# Patient Record
Sex: MALE | Race: WHITE | NOT HISPANIC OR LATINO
[De-identification: names, ages, dates, MRNs, and addresses within clinical notes are randomized per-mention and may not be internally consistent; named-entity substitution may affect disease eponyms.]

---

## 2024-02-29 PROBLEM — Z00.00 ENCOUNTER FOR PREVENTIVE HEALTH EXAMINATION: Status: ACTIVE | Noted: 2024-02-29

## 2024-03-05 ENCOUNTER — APPOINTMENT (OUTPATIENT)
Dept: CARDIOLOGY | Facility: CLINIC | Age: 59
End: 2024-03-05
Payer: COMMERCIAL

## 2024-03-05 ENCOUNTER — NON-APPOINTMENT (OUTPATIENT)
Age: 59
End: 2024-03-05

## 2024-03-05 VITALS
DIASTOLIC BLOOD PRESSURE: 87 MMHG | HEART RATE: 77 BPM | OXYGEN SATURATION: 95 % | WEIGHT: 252 LBS | HEIGHT: 68 IN | BODY MASS INDEX: 38.19 KG/M2 | SYSTOLIC BLOOD PRESSURE: 146 MMHG

## 2024-03-05 DIAGNOSIS — R06.02 SHORTNESS OF BREATH: ICD-10-CM

## 2024-03-05 DIAGNOSIS — K75.81 NONALCOHOLIC STEATOHEPATITIS (NASH): ICD-10-CM

## 2024-03-05 DIAGNOSIS — J45.20 MILD INTERMITTENT ASTHMA, UNCOMPLICATED: ICD-10-CM

## 2024-03-05 DIAGNOSIS — Z78.9 OTHER SPECIFIED HEALTH STATUS: ICD-10-CM

## 2024-03-05 DIAGNOSIS — I49.3 VENTRICULAR PREMATURE DEPOLARIZATION: ICD-10-CM

## 2024-03-05 DIAGNOSIS — I05.9 RHEUMATIC MITRAL VALVE DISEASE, UNSPECIFIED: ICD-10-CM

## 2024-03-05 PROCEDURE — 99204 OFFICE O/P NEW MOD 45 MIN: CPT

## 2024-03-05 PROCEDURE — G2211 COMPLEX E/M VISIT ADD ON: CPT

## 2024-03-05 PROCEDURE — 93000 ELECTROCARDIOGRAM COMPLETE: CPT

## 2024-03-05 RX ORDER — LISINOPRIL 5 MG/1
5 TABLET ORAL
Refills: 0 | Status: ACTIVE | COMMUNITY

## 2024-03-05 RX ORDER — BUDESONIDE AND FORMOTEROL FUMARATE DIHYDRATE 160; 4.5 UG/1; UG/1
AEROSOL RESPIRATORY (INHALATION)
Refills: 0 | Status: ACTIVE | COMMUNITY

## 2024-03-05 NOTE — HISTORY OF PRESENT ILLNESS
[FreeTextEntry1] : Ck presented to the office today for a cardiovascular evaluation.  He presents for the further evaluation of his risk factors.  He is now 58 years old, with a history of hypertension.  He has a history of hyperlipidemia not treated until recently.  He does not have a history of diabetes.  He is not known to have any underlying structural heart disease.  He was more recently discovered to have a high calcium score, placing him into the 96 percentile for age and ethnicity.  He has a history of nonalcoholic steatohepatitis, and he has been on Ozempic for about the last year.  LFTs, which have been elevated, have more recently been normal.  He has a history of PVCs in the past.  At baseline, he tries to stay physically active.  He does not exercise, but he does remodeling, and his own yard work.  With regular physical activities, he feels well, without reproducible chest discomfort or shortness of breath suggestive of angina.  He may experience a degree of dyspnea with activity, not definitively out of proportion to his degree of conditioning.  He denies orthopnea, PND and lower extremity edema.  He denies palpitations, dizziness and syncope.  He has been in his usual state of health generally speaking.  At the behest of his wife, he had a calcium score.  This placed him into the 96th percentile.  He was started on a small dose of atorvastatin.  He was concerned about the implications for his overall cardiovascular health, and therefore comes to the office today for evaluation.  Family history includes a history of colon cancer.  His father had a myocardial infarction at the age of 58, and has subsequent double vessel CABG.

## 2024-03-05 NOTE — PHYSICAL EXAM
[Well Developed] : well developed [No Acute Distress] : no acute distress [Well Nourished] : well nourished [Normal Conjunctiva] : normal conjunctiva [Normal Venous Pressure] : normal venous pressure [No Carotid Bruit] : no carotid bruit [Normal S1, S2] : normal S1, S2 [No Murmur] : no murmur [No Rub] : no rub [Clear Lung Fields] : clear lung fields [No Gallop] : no gallop [Good Air Entry] : good air entry [No Respiratory Distress] : no respiratory distress  [Soft] : abdomen soft [Non Tender] : non-tender [No Masses/organomegaly] : no masses/organomegaly [Normal Bowel Sounds] : normal bowel sounds [Normal Gait] : normal gait [No Edema] : no edema [No Cyanosis] : no cyanosis [No Clubbing] : no clubbing [No Varicosities] : no varicosities [Moves all extremities] : moves all extremities [No Skin Lesions] : no skin lesions [No Rash] : no rash [No Focal Deficits] : no focal deficits [Alert and Oriented] : alert and oriented [Normal Speech] : normal speech [Normal memory] : normal memory

## 2024-03-05 NOTE — DISCUSSION/SUMMARY
[FreeTextEntry1] : Ck has a history of mild hypertension, hypercholesterolemia, nonalcoholic steatohepatitis.  He has a history of PVCs in the past.  He has been discovered to have a high calcium score, placing him into the 96 percentile.  We had an extensive conversation about his overall cardiovascular risk.  He is overall quite concerned, and wishes to make modifications where appropriate.  It is not clear whether his dyspnea reflects deconditioning, or is a reflection of underlying structural heart disease.  I have suggested an echocardiogram as well as nuclear stress testing.  I have requested a follow-up in 6 weeks.  He will plan to have blood work done at that time so that we can hopefully ramp up his dose of atorvastatin.  We will certainly need to keep a close eye on his LFTs.  I would consider the possibility of an extended Holter at the time of future visits, pending his clinical course. [EKG obtained to assist in diagnosis and management of assessed problem(s)] : EKG obtained to assist in diagnosis and management of assessed problem(s)

## 2024-03-07 ENCOUNTER — APPOINTMENT (OUTPATIENT)
Dept: CARDIOLOGY | Facility: CLINIC | Age: 59
End: 2024-03-07
Payer: COMMERCIAL

## 2024-03-07 ENCOUNTER — TRANSCRIPTION ENCOUNTER (OUTPATIENT)
Age: 59
End: 2024-03-07

## 2024-03-07 PROCEDURE — 93306 TTE W/DOPPLER COMPLETE: CPT

## 2024-03-07 RX ADMIN — PERFLUTREN MG/ML: 6.52 INJECTION, SUSPENSION INTRAVENOUS at 00:00

## 2024-03-08 ENCOUNTER — NON-APPOINTMENT (OUTPATIENT)
Age: 59
End: 2024-03-08

## 2024-03-08 RX ORDER — PERFLUTREN 6.52 MG/ML
6.52 INJECTION, SUSPENSION INTRAVENOUS
Qty: 3 | Refills: 0 | Status: COMPLETED | OUTPATIENT
Start: 2024-03-07

## 2024-03-11 ENCOUNTER — APPOINTMENT (OUTPATIENT)
Dept: CARDIOLOGY | Facility: CLINIC | Age: 59
End: 2024-03-11
Payer: COMMERCIAL

## 2024-03-11 PROCEDURE — 78452 HT MUSCLE IMAGE SPECT MULT: CPT

## 2024-03-11 PROCEDURE — A9500: CPT

## 2024-03-11 PROCEDURE — 93015 CV STRESS TEST SUPVJ I&R: CPT

## 2024-04-18 ENCOUNTER — APPOINTMENT (OUTPATIENT)
Dept: CARDIOLOGY | Facility: CLINIC | Age: 59
End: 2024-04-18
Payer: COMMERCIAL

## 2024-04-18 ENCOUNTER — NON-APPOINTMENT (OUTPATIENT)
Age: 59
End: 2024-04-18

## 2024-04-18 VITALS
SYSTOLIC BLOOD PRESSURE: 127 MMHG | OXYGEN SATURATION: 96 % | BODY MASS INDEX: 39.25 KG/M2 | WEIGHT: 259 LBS | HEIGHT: 68 IN | DIASTOLIC BLOOD PRESSURE: 85 MMHG | HEART RATE: 61 BPM

## 2024-04-18 DIAGNOSIS — I10 ESSENTIAL (PRIMARY) HYPERTENSION: ICD-10-CM

## 2024-04-18 DIAGNOSIS — I25.10 ATHEROSCLEROTIC HEART DISEASE OF NATIVE CORONARY ARTERY W/OUT ANGINA PECTORIS: ICD-10-CM

## 2024-04-18 DIAGNOSIS — E78.00 PURE HYPERCHOLESTEROLEMIA, UNSPECIFIED: ICD-10-CM

## 2024-04-18 PROCEDURE — G2211 COMPLEX E/M VISIT ADD ON: CPT | Mod: NC,1L

## 2024-04-18 PROCEDURE — 99214 OFFICE O/P EST MOD 30 MIN: CPT

## 2024-04-18 PROCEDURE — 93000 ELECTROCARDIOGRAM COMPLETE: CPT

## 2024-04-18 RX ORDER — SEMAGLUTIDE 1.34 MG/ML
2 INJECTION, SOLUTION SUBCUTANEOUS
Refills: 0 | Status: ACTIVE | COMMUNITY

## 2024-04-18 RX ORDER — ATORVASTATIN CALCIUM 10 MG/1
10 TABLET, FILM COATED ORAL
Refills: 0 | Status: ACTIVE | COMMUNITY

## 2024-04-20 LAB
ALBUMIN SERPL ELPH-MCNC: 4.7 G/DL
ALP BLD-CCNC: 121 U/L
ALT SERPL-CCNC: 24 U/L
ANION GAP SERPL CALC-SCNC: 11 MMOL/L
AST SERPL-CCNC: 25 U/L
BILIRUB SERPL-MCNC: 0.8 MG/DL
BUN SERPL-MCNC: 12 MG/DL
CALCIUM SERPL-MCNC: 10 MG/DL
CHLORIDE SERPL-SCNC: 104 MMOL/L
CHOLEST SERPL-MCNC: 137 MG/DL
CO2 SERPL-SCNC: 26 MMOL/L
CREAT SERPL-MCNC: 0.91 MG/DL
EGFR: 96 ML/MIN/1.73M2
GLUCOSE SERPL-MCNC: 98 MG/DL
HDLC SERPL-MCNC: 56 MG/DL
LDLC SERPL CALC-MCNC: 63 MG/DL
NONHDLC SERPL-MCNC: 81 MG/DL
POTASSIUM SERPL-SCNC: 4.5 MMOL/L
PROT SERPL-MCNC: 7.6 G/DL
SODIUM SERPL-SCNC: 140 MMOL/L
TRIGL SERPL-MCNC: 95 MG/DL

## 2024-04-20 NOTE — DISCUSSION/SUMMARY
[EKG obtained to assist in diagnosis and management of assessed problem(s)] : EKG obtained to assist in diagnosis and management of assessed problem(s) [FreeTextEntry1] : Ck has a history of mild hypertension, hypercholesterolemia, nonalcoholic steatohepatitis.  He has a history of PVCs in the past.  He has been discovered to have a high calcium score, placing him into the 96 percentile.  We had an extensive conversation about his overall cardiovascular risk.  He arrives today feeling well. He is euvolemic on exam. ECG illustrates sinus rhythm without concerns for ischemia. His blood pressure is controlled.   Cardiac testing including echocardiogram and nuclear stress testing were overall unrevealing, Normal LVEF and no evidence of infarct or ischemia. ECG is without ectopic beats and none noted on auscultation during physical exam, therefore, we can hold off at this time on any ambulatory rhythm testing.  He will go for repeat Lipid panel today, pending results will consider increasing dose of atorvastatin from 10mg to 20mg. We will certainly need to keep a close eye on his LFTs.   He will continue lisinopril 5mg for blood pressure management.  There was Shared decision-making regarding His medical management, including discussion regarding Life style modifications such as; diet, exercise and weight maintenance for optimal CV outcomes.  We will be in touch after his blood work is resulted, If all is well, he will return for follow-up in 6 months.

## 2024-04-20 NOTE — CARDIOLOGY SUMMARY
[de-identified] : March 5, 2024 normal sinus rhythm, PVC 4/18/24 sinus bradycardia [de-identified] : 3/11/24 nuclear 93% MPHR EF 51% 10.1 METS prominent apical cleft [de-identified] : 3/7/24 58% NMl LV/RV trace MR asc aortic diameter of 4 cm [de-identified] : 2/28/24 CT calcium score 908 96th percentile for age and ethnicity

## 2024-04-20 NOTE — ADDENDUM
[FreeTextEntry1] : elev ca score, garcia echo and nuc with reassuring results overall, 10 mets no ischemia labs this am

## 2024-04-20 NOTE — HISTORY OF PRESENT ILLNESS
[FreeTextEntry1] : Ck presented to the office today for a cardiovascular evaluation.  He presents for the further evaluation of his risk factors.  He is now 58 years old, with a history of hypertension.  He has a history of hyperlipidemia not treated until recently.  He does not have a history of diabetes.  He is not known to have any underlying structural heart disease.  He was more recently discovered to have a high calcium score, placing him into the 96 percentile for age and ethnicity.  He has a history of nonalcoholic steatohepatitis, and he has been on Ozempic for this for about the last year.  LFTs, which have been elevated at baseline, have more recently been normal.  He has a history of PVCs in the past.  Family history includes a history of colon cancer.  His father had a myocardial infarction at the age of 58, and has subsequent double vessel CABG.   At the behest of his wife, he had a calcium score.  This placed him into the 96th percentile.  He was started on a small dose of atorvastatin.  He was concerned about the implications for his overall cardiovascular health, and therefore comes for evaluation.  At baseline, he tries to stay physically active.  He does not exercise, but he does remodeling, and his own yard work.  With regular physical activities, he feels well, without reproducible chest discomfort or shortness of breath suggestive of angina.  He may experience a degree of dyspnea with activity, not definitively out of proportion to his degree of conditioning.  He denies orthopnea, PND and lower extremity edema.  He denies palpitations, dizziness and syncope.  He has been in his usual state of health generally speaking.

## 2024-10-18 ENCOUNTER — APPOINTMENT (OUTPATIENT)
Dept: CARDIOLOGY | Facility: CLINIC | Age: 59
End: 2024-10-18
Payer: COMMERCIAL

## 2024-10-18 ENCOUNTER — NON-APPOINTMENT (OUTPATIENT)
Age: 59
End: 2024-10-18

## 2024-10-18 VITALS
BODY MASS INDEX: 38.65 KG/M2 | HEART RATE: 62 BPM | SYSTOLIC BLOOD PRESSURE: 126 MMHG | HEIGHT: 68 IN | WEIGHT: 255 LBS | OXYGEN SATURATION: 96 % | DIASTOLIC BLOOD PRESSURE: 79 MMHG

## 2024-10-18 DIAGNOSIS — I10 ESSENTIAL (PRIMARY) HYPERTENSION: ICD-10-CM

## 2024-10-18 DIAGNOSIS — I25.10 ATHEROSCLEROTIC HEART DISEASE OF NATIVE CORONARY ARTERY W/OUT ANGINA PECTORIS: ICD-10-CM

## 2024-10-18 PROCEDURE — G2211 COMPLEX E/M VISIT ADD ON: CPT | Mod: NC

## 2024-10-18 PROCEDURE — 93000 ELECTROCARDIOGRAM COMPLETE: CPT

## 2024-10-18 PROCEDURE — 99214 OFFICE O/P EST MOD 30 MIN: CPT

## 2025-04-04 ENCOUNTER — NON-APPOINTMENT (OUTPATIENT)
Age: 60
End: 2025-04-04

## 2025-04-04 ENCOUNTER — APPOINTMENT (OUTPATIENT)
Dept: CARDIOLOGY | Facility: CLINIC | Age: 60
End: 2025-04-04
Payer: COMMERCIAL

## 2025-04-04 VITALS
WEIGHT: 255 LBS | HEART RATE: 75 BPM | SYSTOLIC BLOOD PRESSURE: 122 MMHG | BODY MASS INDEX: 38.65 KG/M2 | HEIGHT: 68 IN | OXYGEN SATURATION: 95 % | DIASTOLIC BLOOD PRESSURE: 85 MMHG

## 2025-04-04 VITALS — SYSTOLIC BLOOD PRESSURE: 125 MMHG | DIASTOLIC BLOOD PRESSURE: 80 MMHG

## 2025-04-04 DIAGNOSIS — I25.10 ATHEROSCLEROTIC HEART DISEASE OF NATIVE CORONARY ARTERY W/OUT ANGINA PECTORIS: ICD-10-CM

## 2025-04-04 PROCEDURE — 93000 ELECTROCARDIOGRAM COMPLETE: CPT

## 2025-04-04 PROCEDURE — 99214 OFFICE O/P EST MOD 30 MIN: CPT

## 2025-06-18 ENCOUNTER — APPOINTMENT (OUTPATIENT)
Dept: CARDIOLOGY | Facility: CLINIC | Age: 60
End: 2025-06-18
Payer: COMMERCIAL

## 2025-06-18 VITALS
OXYGEN SATURATION: 97 % | HEIGHT: 68 IN | DIASTOLIC BLOOD PRESSURE: 85 MMHG | BODY MASS INDEX: 38.49 KG/M2 | SYSTOLIC BLOOD PRESSURE: 135 MMHG | HEART RATE: 63 BPM | WEIGHT: 254 LBS

## 2025-06-18 PROCEDURE — 93000 ELECTROCARDIOGRAM COMPLETE: CPT

## 2025-06-18 PROCEDURE — 99214 OFFICE O/P EST MOD 30 MIN: CPT
